# Patient Record
Sex: FEMALE | Race: WHITE | NOT HISPANIC OR LATINO
[De-identification: names, ages, dates, MRNs, and addresses within clinical notes are randomized per-mention and may not be internally consistent; named-entity substitution may affect disease eponyms.]

---

## 2017-04-11 ENCOUNTER — FORM ENCOUNTER (OUTPATIENT)
Age: 47
End: 2017-04-11

## 2017-04-17 ENCOUNTER — FORM ENCOUNTER (OUTPATIENT)
Age: 47
End: 2017-04-17

## 2017-04-18 ENCOUNTER — FORM ENCOUNTER (OUTPATIENT)
Age: 47
End: 2017-04-18

## 2017-04-24 ENCOUNTER — FORM ENCOUNTER (OUTPATIENT)
Age: 47
End: 2017-04-24

## 2017-04-27 ENCOUNTER — FORM ENCOUNTER (OUTPATIENT)
Age: 47
End: 2017-04-27

## 2017-07-10 ENCOUNTER — FORM ENCOUNTER (OUTPATIENT)
Age: 47
End: 2017-07-10

## 2017-09-18 ENCOUNTER — FORM ENCOUNTER (OUTPATIENT)
Age: 47
End: 2017-09-18

## 2017-10-01 ENCOUNTER — FORM ENCOUNTER (OUTPATIENT)
Age: 47
End: 2017-10-01

## 2017-10-12 ENCOUNTER — FORM ENCOUNTER (OUTPATIENT)
Age: 47
End: 2017-10-12

## 2017-10-18 ENCOUNTER — FORM ENCOUNTER (OUTPATIENT)
Age: 47
End: 2017-10-18

## 2017-10-31 ENCOUNTER — FORM ENCOUNTER (OUTPATIENT)
Age: 47
End: 2017-10-31

## 2017-11-15 ENCOUNTER — FORM ENCOUNTER (OUTPATIENT)
Age: 47
End: 2017-11-15

## 2017-11-27 ENCOUNTER — FORM ENCOUNTER (OUTPATIENT)
Age: 47
End: 2017-11-27

## 2017-12-06 ENCOUNTER — FORM ENCOUNTER (OUTPATIENT)
Age: 47
End: 2017-12-06

## 2018-04-30 ENCOUNTER — FORM ENCOUNTER (OUTPATIENT)
Age: 48
End: 2018-04-30

## 2018-10-07 ENCOUNTER — FORM ENCOUNTER (OUTPATIENT)
Age: 48
End: 2018-10-07

## 2019-04-08 ENCOUNTER — FORM ENCOUNTER (OUTPATIENT)
Age: 49
End: 2019-04-08

## 2019-10-09 ENCOUNTER — FORM ENCOUNTER (OUTPATIENT)
Age: 49
End: 2019-10-09

## 2019-11-03 ENCOUNTER — FORM ENCOUNTER (OUTPATIENT)
Age: 49
End: 2019-11-03

## 2020-06-22 ENCOUNTER — FORM ENCOUNTER (OUTPATIENT)
Age: 50
End: 2020-06-22

## 2020-11-05 ENCOUNTER — FORM ENCOUNTER (OUTPATIENT)
Age: 50
End: 2020-11-05

## 2020-11-10 ENCOUNTER — FORM ENCOUNTER (OUTPATIENT)
Age: 50
End: 2020-11-10

## 2021-11-04 PROBLEM — Z00.00 ENCOUNTER FOR PREVENTIVE HEALTH EXAMINATION: Status: ACTIVE | Noted: 2021-11-04

## 2021-11-11 ENCOUNTER — NON-APPOINTMENT (OUTPATIENT)
Age: 51
End: 2021-11-11

## 2021-11-16 ENCOUNTER — APPOINTMENT (OUTPATIENT)
Dept: BREAST CENTER | Facility: CLINIC | Age: 51
End: 2021-11-16
Payer: COMMERCIAL

## 2021-11-16 ENCOUNTER — NON-APPOINTMENT (OUTPATIENT)
Age: 51
End: 2021-11-16

## 2021-11-16 VITALS
HEART RATE: 88 BPM | HEIGHT: 65 IN | BODY MASS INDEX: 29.99 KG/M2 | WEIGHT: 180 LBS | SYSTOLIC BLOOD PRESSURE: 132 MMHG | DIASTOLIC BLOOD PRESSURE: 66 MMHG

## 2021-11-16 DIAGNOSIS — Z85.3 PERSONAL HISTORY OF MALIGNANT NEOPLASM OF BREAST: ICD-10-CM

## 2021-11-16 DIAGNOSIS — Z78.9 OTHER SPECIFIED HEALTH STATUS: ICD-10-CM

## 2021-11-16 DIAGNOSIS — Z87.891 PERSONAL HISTORY OF NICOTINE DEPENDENCE: ICD-10-CM

## 2021-11-16 PROCEDURE — 99213 OFFICE O/P EST LOW 20 MIN: CPT

## 2021-11-16 PROCEDURE — 93702 BIS XTRACELL FLUID ANALYSIS: CPT

## 2021-11-16 RX ORDER — TAMOXIFEN CITRATE 20 MG/1
TABLET, FILM COATED ORAL
Refills: 0 | Status: ACTIVE | COMMUNITY

## 2021-11-16 RX ORDER — FOLIC ACID 20 MG
CAPSULE ORAL
Refills: 0 | Status: ACTIVE | COMMUNITY

## 2021-11-16 NOTE — ASSESSMENT
[FreeTextEntry1] : 51 year old female previously under the care of Dr. 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 Physican exam was WNL. Imaging today was BI-RADS 2. Discussed getting a baseline SOZO measurement today due to history of left arm swelling. She will return in 3 months for a repeat measurement and was referred to Physical Therapy for weakness. Patient is to return in 1 year of screening mammogram, US, and office visit.\par \par The patient had a baseline which I reviewed today. The score is within normal limits.  Bioimpedance spectroscopy helps identify the onset of lymphedema in an arm or leg before patients experience noticeable swelling. Research has shown that the early detection of lymphedema using L-Dex combined with treatment can reduce progression to chronic lymphedema by 95% in breast cancer patients. Whenever possible, patients are tested for baseline L-Dex score before cancer treatment begins and then are reassessed during regular follow-up visits using the SOZO device. Otherwise, this can be started postoperatively and continued during regular follow-up visits. If the patient’s L-Dex score increases above normal levels, that is a sign that lymphedema is developing and a referral is made to physical therapy for further evaluation and early compression treatment. \par \par

## 2021-11-16 NOTE — PAST MEDICAL HISTORY
[Menarche Age ____] : age at menarche was [unfilled] [Menopause Age____] : age at menopause was [unfilled] [Total Preg ___] : G[unfilled] [Live Births ___] : P[unfilled]  [Age At Live Birth ___] : Age at live birth: [unfilled] [History of Hormone Replacement Treatment] : has no history of hormone replacement treatment [FreeTextEntry5] : no [FreeTextEntry6] : no [FreeTextEntry7] : yes [FreeTextEntry8] : yes

## 2021-11-16 NOTE — HISTORY OF PRESENT ILLNESS
[FreeTextEntry1] : 51 year old female previously under the care of Dr. Rehana Sidhu last evaluated 11/2020 presents with a history of multifocal left 2:00 and 3:00 invasive ductal carcinoma ER 98%, [place] 98%, HER-2 equivocal FISH negative, DCIS with metastatic disease to left axillary lymph nodes in 2017 s/p neoadjuvant chemotherapy (Dr. [facility]) followed by left lumpectomy with left SLNB (1/4 lymph nodes positive for macrometastatic disease) showing 0.4 and 0.3cm of invasive carcinoma. She underwent additional left axillary excision on 11/21/2017 showing 0/4 lymph nodes negative for carcinoma. She is s/p XRT. \par \par Denies any palpable abnormalities, any skin changes/dimpling, or any nipple discharge bilaterally. \par \par She completed [facility]OC genetic testing in 2017 with negative results. \par \par Denies family history of breast or ovarian cancer.\par \par

## 2021-11-16 NOTE — DATA REVIEWED
[FreeTextEntry1] : Data Reviewed: \par \par 4/28/2017 ([facility]) [facility] panel testing showing negative results. \par \par 11/6/2020 ([facility]) bilateral dx mammogram/US showing scattered areas of fibroglandular density, No suspicious masses, architectural distortion, or significant calcifications are detected.There is stable post surgical skin thickening/retraction, dystrophic calcifications and distortion in the upper-outer quadrant of the left breast. No suspicious solid or complex cystic mass is detected in either breast. There is no pathological lymphadenopathy in either axilla. No mammographic or ultrasonographic evidence of malignancy. Benign BIRADS 2 \par \par 11/16/2021 ([facility]) bilateral screening mammogram/US showing scattered areas of fibroglandular density. Benign findings. N mammographic or ultrasonic evidence of malignancy. No significant change. Posttreatment changes left breast, stable. Benign. BI-RADS 2.

## 2021-11-16 NOTE — PHYSICAL EXAM
[Supple] : supple [No Supraclavicular Adenopathy] : no supraclavicular adenopathy [No Cervical Adenopathy] : no cervical adenopathy [Examined in the supine and seated position] : examined in the supine and seated position [Symmetrical] : symmetrical [No dominant masses] : no dominant masses in right breast  [No dominant masses] : no dominant masses left breast [No Nipple Retraction] : no left nipple retraction [No Nipple Discharge] : no left nipple discharge [No Edema] : no edema [de-identified] : Left breast: well healing 3:00 breast and axillary incision, minimal tenderness with no erythema, seroma or ecchymosis. No sign of infection

## 2021-11-17 DIAGNOSIS — M62.81 MUSCLE WEAKNESS (GENERALIZED): ICD-10-CM

## 2022-01-31 ENCOUNTER — NON-APPOINTMENT (OUTPATIENT)
Age: 52
End: 2022-01-31

## 2022-02-01 ENCOUNTER — APPOINTMENT (OUTPATIENT)
Dept: BREAST CENTER | Facility: CLINIC | Age: 52
End: 2022-02-01

## 2022-06-02 ENCOUNTER — NON-APPOINTMENT (OUTPATIENT)
Age: 52
End: 2022-06-02

## 2022-11-15 ENCOUNTER — TRANSCRIPTION ENCOUNTER (OUTPATIENT)
Age: 52
End: 2022-11-15

## 2022-11-15 ENCOUNTER — APPOINTMENT (OUTPATIENT)
Dept: BREAST CENTER | Facility: CLINIC | Age: 52
End: 2022-11-15

## 2022-11-15 VITALS
WEIGHT: 209 LBS | SYSTOLIC BLOOD PRESSURE: 156 MMHG | HEART RATE: 103 BPM | HEIGHT: 65 IN | BODY MASS INDEX: 34.82 KG/M2 | DIASTOLIC BLOOD PRESSURE: 101 MMHG

## 2022-11-15 PROCEDURE — 93702 BIS XTRACELL FLUID ANALYSIS: CPT | Mod: NC

## 2022-11-15 PROCEDURE — 99213 OFFICE O/P EST LOW 20 MIN: CPT

## 2022-11-19 NOTE — PHYSICAL EXAM
[Supple] : supple [No Supraclavicular Adenopathy] : no supraclavicular adenopathy [No Cervical Adenopathy] : no cervical adenopathy [Examined in the supine and seated position] : examined in the supine and seated position [Symmetrical] : symmetrical [No dominant masses] : no dominant masses in right breast  [No dominant masses] : no dominant masses left breast [No Nipple Retraction] : no left nipple retraction [No Nipple Discharge] : no left nipple discharge [No Edema] : no edema [de-identified] : Left breast: well healing 3:00 breast and axillary incision

## 2022-11-19 NOTE — HISTORY OF PRESENT ILLNESS
[FreeTextEntry1] : 52 year old female presents for breast cancer surveillance with a history of multifocal left 2:00 and 3:00 invasive ductal carcinoma ER 98%, [place] 98%, HER-2 equivocal FISH negative, DCIS with metastatic disease to left axillary lymph nodes in 2017 s/p neoadjuvant chemotherapy (Dr. [facility]) followed by left lumpectomy with left SLNB (1/4 lymph nodes positive for macrometastatic disease) showing 0.4 and 0.3cm of invasive carcinoma. She underwent additional left axillary excision on 11/21/2017 showing 0/4 lymph nodes negative for carcinoma. She is s/p XRT. On Tamoxifen, plan for 10 years, managed by Dr. Ruiz. \par \par Denies any palpable abnormalities, any skin changes/dimpling, or any nipple discharge bilaterally. Denies family history of breast or ovarian cancer. She completed [facility] genetic testing in 2017 with negative results. \par \par Previously under the care of Dr. Rehana Sidhu\par \par B/L sMMG/US performed today, BIRADS-2\par \par

## 2022-11-19 NOTE — DATA REVIEWED
[FreeTextEntry1] : Data Reviewed: \par \par 4/28/2017 ([facility]) [facility] panel testing showing negative results. \par \par 11/6/2020 ([facility]) bilateral dx mammogram/US showing scattered areas of fibroglandular density, No suspicious masses, architectural distortion, or significant calcifications are detected.There is stable post surgical skin thickening/retraction, dystrophic calcifications and distortion in the upper-outer quadrant of the left breast. No suspicious solid or complex cystic mass is detected in either breast. There is no pathological lymphadenopathy in either axilla. No mammographic or ultrasonographic evidence of malignancy. Benign BIRADS 2 \par \par 11/16/2021 ([facility]) bilateral screening mammogram/US showing scattered areas of fibroglandular density. Benign findings. N mammographic or ultrasonic evidence of malignancy. No significant change. Posttreatment changes left breast, stable. Benign. BI-RADS 2.\par \par 11/15/22 B/L sMMG/US (R): scattered areas of fbg density. Stable post lumpectomy changes are seen in the left breast. No mammographic or US evidence of malignancy.  FOLLOW-UP: Annual screening. BI-RADS 2:  Benign.

## 2022-11-19 NOTE — ASSESSMENT
[FreeTextEntry1] : 52 year old female presents for breast cancer surveillance with a history of multifocal left 2:00 and 3:00 invasive ductal carcinoma ER 98%, [place] 98%, HER-2 equivocal FISH negative, DCIS with metastatic disease to left axillary lymph nodes in 2017 s/p neoadjuvant chemotherapy (Dr. [facility]) followed by left lumpectomy with left SLNB (1/4 lymph nodes positive for macrometastatic disease) showing 0.4 and 0.3cm of invasive carcinoma. She underwent additional left axillary excision on 11/21/2017 showing 0/4 lymph nodes negative for carcinoma. She is s/p XRT. Genetic testing negative. B/L sMMG/US 11/15/22, BIRADS-2. Patient with history of elevated sozo, performed today 1.9, WNL. Patient will have B/L sMMG/US and re-examination in 1 year. Patient verbalizes understanding and is in agreement with the plan.\par \par

## 2023-10-09 ENCOUNTER — NON-APPOINTMENT (OUTPATIENT)
Age: 53
End: 2023-10-09

## 2023-10-20 ENCOUNTER — NON-APPOINTMENT (OUTPATIENT)
Age: 53
End: 2023-10-20

## 2023-11-21 ENCOUNTER — APPOINTMENT (OUTPATIENT)
Dept: BREAST CENTER | Facility: CLINIC | Age: 53
End: 2023-11-21

## 2023-12-12 ENCOUNTER — APPOINTMENT (OUTPATIENT)
Dept: BREAST CENTER | Facility: CLINIC | Age: 53
End: 2023-12-12
Payer: COMMERCIAL

## 2023-12-12 VITALS
SYSTOLIC BLOOD PRESSURE: 119 MMHG | DIASTOLIC BLOOD PRESSURE: 89 MMHG | HEART RATE: 84 BPM | HEIGHT: 65 IN | WEIGHT: 204 LBS | BODY MASS INDEX: 33.99 KG/M2

## 2023-12-12 DIAGNOSIS — Z08 ENCOUNTER FOR FOLLOW-UP EXAMINATION AFTER COMPLETED TREATMENT FOR MALIGNANT NEOPLASM: ICD-10-CM

## 2023-12-12 DIAGNOSIS — R92.30 DENSE BREASTS, UNSPECIFIED: ICD-10-CM

## 2023-12-12 DIAGNOSIS — Z85.3 ENCOUNTER FOR FOLLOW-UP EXAMINATION AFTER COMPLETED TREATMENT FOR MALIGNANT NEOPLASM: ICD-10-CM

## 2023-12-12 DIAGNOSIS — Z85.3 PERSONAL HISTORY OF MALIGNANT NEOPLASM OF BREAST: ICD-10-CM

## 2023-12-12 PROCEDURE — 99213 OFFICE O/P EST LOW 20 MIN: CPT

## 2023-12-18 NOTE — HISTORY OF PRESENT ILLNESS
[FreeTextEntry1] : 53 year old female presents for breast cancer surveillance with a history of multifocal left 2:00 and 3:00 invasive ductal carcinoma ER 98%, [place] 98%, HER-2 equivocal FISH negative, DCIS with metastatic disease to left axillary lymph nodes in 2017 s/p neoadjuvant chemotherapy (Dr. [facility]) followed by left lumpectomy with left SLNB (1/4 lymph nodes positive for macrometastatic disease) showing 0.4 and 0.3cm of invasive carcinoma. She underwent additional left axillary excision on 11/21/2017 showing 0/4 lymph nodes negative for carcinoma. She is s/p XRT. On Tamoxifen, plan for 10 years, managed by Dr. Ruiz.   Denies any palpable abnormalities, any skin changes/dimpling, or any nipple discharge bilaterally. Denies family history of breast or ovarian cancer. She completed [facility] genetic testing in 2017 with negative results.   Previously under the care of Dr. Rehana JOHNSON/ZAFAR sMMG/US performed 11/21/23, BIRADS-2

## 2023-12-18 NOTE — PAST MEDICAL HISTORY
[History of Hormone Replacement Treatment] : has no history of hormone replacement treatment [FreeTextEntry5] : no [FreeTextEntry6] : no [FreeTextEntry7] : yes [FreeTextEntry8] : yes

## 2023-12-18 NOTE — DATA REVIEWED
[FreeTextEntry1] : Data Reviewed:   4/28/2017 ([facility]) [facility] panel testing showing negative results.   11/6/2020 ([facility]) bilateral dx mammogram/US showing scattered areas of fibroglandular density, No suspicious masses, architectural distortion, or significant calcifications are detected.There is stable post surgical skin thickening/retraction, dystrophic calcifications and distortion in the upper-outer quadrant of the left breast. No suspicious solid or complex cystic mass is detected in either breast. There is no pathological lymphadenopathy in either axilla. No mammographic or ultrasonographic evidence of malignancy. Benign BIRADS 2   11/16/2021 ([facility]) bilateral screening mammogram/US showing scattered areas of fibroglandular density. Benign findings. N mammographic or ultrasonic evidence of malignancy. No significant change. Posttreatment changes left breast, stable. Benign. BI-RADS 2.  11/15/22 B/L sMMG/US (R): scattered areas of fbg density. Stable post lumpectomy changes are seen in the left breast. No mammographic or US evidence of malignancy.  FOLLOW-UP: Annual screening. BI-RADS 2:  Benign.   11/21/23: B/L sMMG/US (R)- Fibroglandular. L UOQ postlumpx changes.Few stable benign appearing coarse calcifications in the left upper outer breast. BIRADS 2.

## 2023-12-18 NOTE — ASSESSMENT
[FreeTextEntry1] : 53 year old female presents for breast cancer surveillance with a history of multifocal left 2:00 and 3:00 invasive ductal carcinoma ER 98%, [place] 98%, HER-2 equivocal FISH negative, DCIS with metastatic disease to left axillary lymph nodes in 2017 s/p neoadjuvant chemotherapy (Dr. [facility]iz[facility]) followed by left lumpectomy with left SLNB (1/4 lymph nodes positive for macrometastatic disease) showing 0.4 and 0.3cm of invasive carcinoma. She underwent additional left axillary excision on 11/21/2017 showing 0/4 lymph nodes negative for carcinoma. She is s/p XRT. Genetic testing negative. B/L sMMG/US 11/21/23, BIRADS-2. Patient will have B/L sMMG/US and re-examination in 1 year. Patient verbalizes understanding and is in agreement with the plan.

## 2024-11-05 ENCOUNTER — APPOINTMENT (OUTPATIENT)
Dept: BREAST CENTER | Facility: CLINIC | Age: 54
End: 2024-11-05

## 2024-11-05 VITALS
BODY MASS INDEX: 33.95 KG/M2 | WEIGHT: 204 LBS | HEART RATE: 106 BPM | SYSTOLIC BLOOD PRESSURE: 119 MMHG | DIASTOLIC BLOOD PRESSURE: 84 MMHG

## 2024-11-05 DIAGNOSIS — Z08 ENCOUNTER FOR FOLLOW-UP EXAMINATION AFTER COMPLETED TREATMENT FOR MALIGNANT NEOPLASM: ICD-10-CM

## 2024-11-05 DIAGNOSIS — Z85.3 PERSONAL HISTORY OF MALIGNANT NEOPLASM OF BREAST: ICD-10-CM

## 2024-11-05 DIAGNOSIS — Z85.3 ENCOUNTER FOR FOLLOW-UP EXAMINATION AFTER COMPLETED TREATMENT FOR MALIGNANT NEOPLASM: ICD-10-CM

## 2024-11-05 DIAGNOSIS — R92.30 DENSE BREASTS, UNSPECIFIED: ICD-10-CM

## 2024-11-05 PROCEDURE — 93702 BIS XTRACELL FLUID ANALYSIS: CPT | Mod: NC

## 2024-11-05 PROCEDURE — 99213 OFFICE O/P EST LOW 20 MIN: CPT
